# Patient Record
Sex: MALE | Race: WHITE | Employment: STUDENT | URBAN - METROPOLITAN AREA
[De-identification: names, ages, dates, MRNs, and addresses within clinical notes are randomized per-mention and may not be internally consistent; named-entity substitution may affect disease eponyms.]

---
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
## 2021-05-23 ENCOUNTER — WALK IN (OUTPATIENT)
Dept: URGENT CARE | Age: 21
End: 2021-05-23
Attending: EMERGENCY MEDICINE

## 2021-05-23 ENCOUNTER — HOSPITAL ENCOUNTER (OUTPATIENT)
Dept: GENERAL RADIOLOGY | Age: 21
Discharge: HOME | End: 2021-05-23
Attending: EMERGENCY MEDICINE

## 2021-05-23 VITALS
DIASTOLIC BLOOD PRESSURE: 76 MMHG | RESPIRATION RATE: 16 BRPM | SYSTOLIC BLOOD PRESSURE: 128 MMHG | WEIGHT: 225 LBS | BODY MASS INDEX: 26.57 KG/M2 | HEART RATE: 69 BPM | OXYGEN SATURATION: 97 % | HEIGHT: 77 IN | TEMPERATURE: 98 F

## 2021-05-23 DIAGNOSIS — T14.90XA INJURY: ICD-10-CM

## 2021-05-23 DIAGNOSIS — S63.501A WRIST SPRAIN, RIGHT, INITIAL ENCOUNTER: Primary | ICD-10-CM

## 2021-05-23 PROCEDURE — 99202 OFFICE O/P NEW SF 15 MIN: CPT

## 2021-05-23 PROCEDURE — 73130 X-RAY EXAM OF HAND: CPT

## 2021-05-23 PROCEDURE — 73110 X-RAY EXAM OF WRIST: CPT

## 2021-05-23 ASSESSMENT — PAIN DESCRIPTION - PAIN TYPE: TYPE: ACUTE PAIN

## 2021-05-23 ASSESSMENT — PAIN SCALES - GENERAL
PAINLEVEL_OUTOF10: 5
PAINLEVEL_OUTOF10: 6

## (undated) NOTE — MR AVS SNAPSHOT
[facility] [address]  [address] [place] [address] Road  [phone]               Thank you for choosing us for your health care visit with FREDA Selby.   We are glad to serve you and happy to provide you with this summary of your vis GO to the ER if any facial or periorbital swelling develops     The patient indicates understanding of treatment plan and agrees to plan.     · If you develop a rash, hives, itching, throat tightness, or shortness of breath while on the antibiotic or shortl phenylephrine or oxymetazoline. First blow the nose gently. Then use the spray. Do not use these medicines more often than directed on the label or symptoms may get worse. You may also use tablets containing pseudoephedrine.  Avoid products that combine ing Allergies as of May 28, 2017     No Known Allergies                Today's Vital Signs     Pulse Height Weight BMI       63 76.5\" (100 %*, Z = 3.05) 177 lb 3.2 oz (94 %*, Z = 1.51) 21.29 kg/m2 (63 %*, Z = 0.32)     *Growth percentiles are based on CDC 2-2 accept and enjoy it. It is also important to encourage play time as soon as they start crawling and walking. As your children grow, continue to help them live a healthy active lifestyle.     To lead a healthy active life, families can strive to reach these